# Patient Record
(demographics unavailable — no encounter records)

---

## 2025-01-03 NOTE — HISTORY OF PRESENT ILLNESS
[FreeTextEntry1] : The 42-year-old patient reports a recurring cyst in the right axilla, initially identified in 2019. It was previously drained by Dr. Vitale in 08/19 but has since returned, now causing pain and requiring further evaluation.  Patient reports that she had been prescribed a hyper cleanse wash which she has been using which has kept the area at bay.  She does not have any other cysts or draining wounds in her axilla under her breasts or in her groin.  No fevers.  The area is raw and there has been some drainage.  No odor

## 2025-01-26 NOTE — ADDENDUM
[FreeTextEntry1] : Blood will be drawn in office today.  This note was written by Mally Zimmer on 01/22/2025 acting as medical scribe for Dr. Eric Powell. I, Dr. Eric Powell, have read and attest that all the information, medical decision making and discharge instructions within are true and accurate.

## 2025-01-26 NOTE — HISTORY OF PRESENT ILLNESS
[FreeTextEntry1] : Ms. MELISSA RICE is a 42 year-old female who presents for initial endocrine evaluation. Patient presents with regard to a history of Pre-. The Pre Dm was diagnosed 10 years ago. She was never on medications for glucose control in the past. She reports dieting in the past and lost some weight but then regained it back. She is trying to get back into weight loss. She has followed various diets and exercised at the the gym. Weight was in the 300s range and went to 216lbs. she is currently trying to reduce her carbohydrates  She too has noted increased scalp hair thinning.   She would go on a diet for 7-9 days and remain off of it for 4-5 weeks. This diet would consist of 2 eggs, an orange, and grapefruit for breakfast. Protein like chicken or fish, along with fruit for lunch. For 2 days, she would have bread and cottage cheese. While following this diet, she states she would lose 5 lbs. While off her diet and back on her regular routine, she would eat some carbs. Likes eating chocolate and candy No juice or soda   She was only on Ozempic for about a month and insurance did not approve it this was back in June 2023 - then restarted it and has been paying out of pocket at this time.  248lbs last year, current weight 230lbs. She has continued on Ozempic 2mg weekly and does report stagnant levels in weight.  --She has never taken Wegovy before.  Activity: Not physically active  The patient's last A1C returned at 5.8% in June 2023. Last LD June 2023 was at 122 with Trig at 128    Family hx:  Father, DM2 thyroid dysfunction  Mother Dm 2 Brother Dm 2 younger brother with thyroid dysfunction  younger sister DM type 2  ____________________________________________________________________________________________________ Additional medical history includes that of low vitamin b12 level, GERD,   Surgeries: none    Allergies: none    Additional Medications: none   Supplements: Vd3 with K2, b12 daily, zinc, mag, fiber, Nutrafol BID   Social Hx: former smoker but now vapes daily, alcohol use none, recreational drugs none

## 2025-02-12 NOTE — PHYSICAL EXAM
[Alert] : alert [Oriented to Person] : oriented to person [Oriented to Place] : oriented to place [Oriented to Time] : oriented to time [Calm] : calm [JVD] : no jugular venous distention  [de-identified] : CATALINA BAUMAN NAD [de-identified] : EOMI [de-identified] : + right axillary hidradenitis. + previous scaring.

## 2025-02-12 NOTE — HISTORY OF PRESENT ILLNESS
[de-identified] : 44 yo female with h/o cyst removal form right axilla s/p I&D prior. presents today for evaluation. She states current tenderness but no active drainage. She states she was referred here to Dr. Merida by Plastic surgery.

## 2025-03-17 NOTE — ASSESSMENT
[FreeTextEntry1] : Patient is well, has no complaints.   incision is well healed. 5 nylon sutures removed.    f/u PRN

## 2025-05-05 NOTE — HISTORY OF PRESENT ILLNESS
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

## 2025-05-05 NOTE — HEALTH RISK ASSESSMENT
[Very Good] : ~his/her~  mood as very good [Yes] : Yes [2 - 4 times a month (2 pts)] : 2-4 times a month (2 points) [Never (0 pts)] : Never (0 points) [No falls in past year] : Patient reported no falls in the past year [0] : 2) Feeling down, depressed, or hopeless: Not at all (0) [PHQ-2 Negative - No further assessment needed] : PHQ-2 Negative - No further assessment needed [[id number]] : 0 [Former] : Former [5-9] : 5-9 [< 15 Years] : < 15 Years [NO] : No [Change in mental status noted] : No change in mental status noted [Language] : denies difficulty with language [Behavior] : denies difficulty with behavior [Learning/Retaining New Information] : denies difficulty learning/retaining new information [Handling Complex Tasks] : denies difficulty handling complex tasks [Reasoning] : denies difficulty with reasoning [Spatial Ability and Orientation] : denies difficulty with spatial ability and orientation [None] : None [With Family] : lives with family [High School] : high school [Feels Safe at Home] : Feels safe at home [Fully functional (bathing, dressing, toileting, transferring, walking, feeding)] : Fully functional (bathing, dressing, toileting, transferring, walking, feeding) [Fully functional (using the telephone, shopping, preparing meals, housekeeping, doing laundry, using] : Fully functional and needs no help or supervision to perform IADLs (using the telephone, shopping, preparing meals, housekeeping, doing laundry, using transportation, managing medications and managing finances) [Reports changes in hearing] : Reports no changes in hearing [Reports changes in vision] : Reports no changes in vision [Reports changes in dental health] : Reports no changes in dental health [Smoke Detector] : smoke detector [Carbon Monoxide Detector] : carbon monoxide detector [Seat Belt] :  uses seat belt

## 2025-05-05 NOTE — PLAN
[FreeTextEntry1] : Annual Did not get flu shot this year  need immunity testing for work  to be evaluated for sleep apnea per pulmonary consider neuro for evaluation of add Routine other blood

## 2025-06-11 NOTE — HISTORY OF PRESENT ILLNESS
[FreeTextEntry1] : Ms. MELISSA RICE is a 43-year-old female who returns for follow-up endocrine re-evaluation. Patient returns with regard to a history of Pre-DM. The Pre Dm was diagnosed 10 years ago. She was never on medications for glucose control in the past.   She was only on Ozempic for about a month before insurance stopped covering it in June 2023 - she then restarted it and had been paying out of pocket afterwards.   She is now taking Mounjaro 12.5 mg/week. Tolerating it well - Has never taken Wegovy before. - Prior was on Ozempic.  The patient states that she never got Zepbound and has been taking Mounjaro. She has been getting Mounjaro from her dietician's NP and is paying out of pocket.  Was 248 lbs in 2024, current weight 226 lbs.    Activity: Trying to walk 10k steps daily. Drinks water frequently.  The patient's latest A1C returned at 5.3% on 05/05/25. POCT glucose today at 104 mg/dL.  She too has noted increased scalp hair thinning. ____________________________________________________________________________________________________ Additional medical history includes that of low vitamin b12 level, GERD  Additional Medications: none   Supplements: Vd3 with K2, b12 daily, zinc, mag, fiber, Nutrafol BID

## 2025-06-11 NOTE — PHYSICAL EXAM
[Alert] : alert [Well Nourished] : well nourished [No Acute Distress] : no acute distress [Normal Sclera/Conjunctiva] : normal sclera/conjunctiva [Well Developed] : well developed [EOMI] : extra ocular movement intact [No Proptosis] : no proptosis [Normal Oropharynx] : the oropharynx was normal [Thyroid Not Enlarged] : the thyroid was not enlarged [No Thyroid Nodules] : no palpable thyroid nodules [No Respiratory Distress] : no respiratory distress [Clear to Auscultation] : lungs were clear to auscultation bilaterally [No Accessory Muscle Use] : no accessory muscle use [Normal S1, S2] : normal S1 and S2 [Normal Rate] : heart rate was normal [No Edema] : no peripheral edema [Regular Rhythm] : with a regular rhythm [Normal Bowel Sounds] : normal bowel sounds [Pedal Pulses Normal] : the pedal pulses are present [Not Distended] : not distended [Not Tender] : non-tender [Soft] : abdomen soft [Normal Anterior Cervical Nodes] : no anterior cervical lymphadenopathy [No Spinal Tenderness] : no spinal tenderness [Spine Straight] : spine straight [No Stigmata of Cushings Syndrome] : no stigmata of Cushings Syndrome [Normal Strength/Tone] : muscle strength and tone were normal [Normal Gait] : normal gait [No Rash] : no rash [No Tremors] : no tremors [Normal Reflexes] : deep tendon reflexes were 2+ and symmetric [Oriented x3] : oriented to person, place, and time [Acanthosis Nigricans] : no acanthosis nigricans

## 2025-06-11 NOTE — ADDENDUM
[FreeTextEntry1] : POCT glucose testing was carried out today given diabetes diagnosis.  This note was written by Mally Zimmer on 06/11/2025 acting as medical scribe for Dr. Eric Powell. I, Dr. Eric Powell, have read and attest that all the information, medical decision making and discharge instructions within are true and accurate.

## 2025-06-11 NOTE — PHYSICAL EXAM
[Alert] : alert [Well Nourished] : well nourished [No Acute Distress] : no acute distress [Well Developed] : well developed [Normal Sclera/Conjunctiva] : normal sclera/conjunctiva [No Proptosis] : no proptosis [EOMI] : extra ocular movement intact [Normal Oropharynx] : the oropharynx was normal [Thyroid Not Enlarged] : the thyroid was not enlarged [No Thyroid Nodules] : no palpable thyroid nodules [No Respiratory Distress] : no respiratory distress [No Accessory Muscle Use] : no accessory muscle use [Clear to Auscultation] : lungs were clear to auscultation bilaterally [Normal Rate] : heart rate was normal [Normal S1, S2] : normal S1 and S2 [No Edema] : no peripheral edema [Regular Rhythm] : with a regular rhythm [Pedal Pulses Normal] : the pedal pulses are present [Normal Bowel Sounds] : normal bowel sounds [Not Tender] : non-tender [Not Distended] : not distended [Soft] : abdomen soft [Normal Anterior Cervical Nodes] : no anterior cervical lymphadenopathy [No Spinal Tenderness] : no spinal tenderness [Spine Straight] : spine straight [No Stigmata of Cushings Syndrome] : no stigmata of Cushings Syndrome [Normal Gait] : normal gait [Normal Strength/Tone] : muscle strength and tone were normal [No Rash] : no rash [Normal Reflexes] : deep tendon reflexes were 2+ and symmetric [No Tremors] : no tremors [Oriented x3] : oriented to person, place, and time [Acanthosis Nigricans] : no acanthosis nigricans

## 2025-07-09 NOTE — PHYSICAL EXAM
[Normal Appearance] : normal appearance [Normal Conjunctiva] : the conjunctiva exhibited no abnormalities [III] : III [Neck Appearance] : the appearance of the neck was normal [Heart Sounds] : normal S1 and S2 [Auscultation Breath Sounds / Voice Sounds] : lungs were clear to auscultation bilaterally [Skin Color & Pigmentation] : normal skin color and pigmentation [Oriented To Time, Place, And Person] : oriented to person, place, and time

## 2025-07-10 NOTE — HISTORY OF PRESENT ILLNESS
[FreeTextEntry1] : 42 yo female with PMH pre-DM, nasal congestion (deviated septum), obesity, and RLS who presents for initial sleep evaluation.   Of note, she has also been diagnosed with RLS by a neurologist Dr. Michael Nissenbaum. Was given gabapentin 300 mg prn. Uses gabapentin 300 mg once every 2 weeks because she tries to limit medication use. Describes sensation of bugs crawling up her legs. Usually happens every night when she is in bed. Sensation is relieved by getting out of bed and changing her clothes. Most recent ferritin level was 28 in 1/2025. She is not currently on iron supplementation.   For obesity, she has been on Mounjaro 15 mg (was previously on Ozempic). She has been on Mounjaro 15 mg for the past week, prior to to that had been on 12.5 mg. She has lost 20 pounds in the past 6 months which she attributes more to diet/exercise than Mounjaro. Currently not covered by insurance and is paying out of pocket.   She reports increase in snoring over the past several months to the point that no one else can sleep in a room with her. She also wakes up with headaches and dry mouth. She has never fallen asleep while driving but sometimes feels drowsy and pulls over.   Sleep routine: goes to bed at 10/10:30, wakes up at 6-6:30AM Latency: 30 minutes Night time awakenings: 2-3 times gasping for air or dry mouth Witnessed apneas: no Snoring: yes Hallucinations/Night time activities: sleep talking Paralysis/Cataplexy: no Sleep quality: non restorative  ESS: Chance of dozing.............Situation 0........................................Sitting and reading 2........................................Watching TV 3........................................Sitting inactive in a public place (eg a theatre or a meeting) 2........................................As a passenger in a car for an hour without a break 3........................................Lying down to rest in the afternoon when circumstances permit 0........................................Sitting and talking to someone 3........................................Sitting quietly after lunch without alcohol 1........................................In a car, while stopped for a few minutes in traffic 14........................................TOTAL SCORE   0 = Never would doze 1 = Slight chance of dozing 2 = Moderate chance of dozing 3 = High chance of dozing

## 2025-07-10 NOTE — CONSULT LETTER
[Dear  ___] : Dear  [unfilled], [Consult Letter:] : I had the pleasure of evaluating your patient, [unfilled]. [Please see my note below.] : Please see my note below. [Consult Closing:] : Thank you very much for allowing me to participate in the care of this patient.  If you have any questions, please do not hesitate to contact me. [Sincerely,] : Sincerely, [FreeTextEntry2] : Dr. Quirino Thompson [FreeTextEntry3] : Angela Fuller MD, FAASM  of Medicine Associate , Fellowship in Pulmonary and Critical Care Medicine Division of Pulmonary, Critical Care & Sleep Medicine [facility] [employer] [facility]

## 2025-07-10 NOTE — END OF VISIT
[] : Fellow [FreeTextEntry3] : Total time spent 45 minutes, which includes review of the medical record, consultant notes, lab results, discussion with the patient, medical management and coordination of care.  [Time Spent: ___ minutes] : I have spent [unfilled] minutes of time on the encounter which excludes teaching and separately reported services.

## 2025-07-10 NOTE — ASSESSMENT
[FreeTextEntry1] : 42 yo female with PMH pre-DM, nasal congestion (deviated septum), obesity, and RLS who presents for initial sleep evaluation.   # Snoring # Excessive daytme sleepiness - HST ordered to evaluate for YULIANA - discussed possible treatment options including PAP therapy, mandibular advancement device, Zepbound (currently on Mounjaro but not covered by insurance)  # RLS Diagnosed with RLS by a neurologist Dr. Michael Nissenbaum. Was given gabapentin 300 mg prn. Uses gabapentin 300 mg once every 2 weeks because she tries to limit medication use. Describes sensation of bugs crawling up her legs. Usually happens every night when she is in bed. Sensation is relieved by getting out of bed and changing her clothes. Most recent ferritin level was 28 in 1/2025. She is not currently on iron supplementation. - check iron panel today - if ferritin below goal will rx iron supplementation     RTC after HST

## 2025-07-10 NOTE — REVIEW OF SYSTEMS
[EDS: ESS=____] : daytime somnolence: ESS=[unfilled] [Fatigue] : fatigue [Nasal Congestion] : nasal congestion [Snoring] : snoring [Obesity] : obesity [History of Iron Deficiency] : history of iron deficiency [Witnessed Apneas] : no witnessed apnea [Heartburn] : no heartburn [Nocturia] : no nocturia

## 2025-07-10 NOTE — CONSULT LETTER
[Dear  ___] : Dear  [unfilled], [Consult Letter:] : I had the pleasure of evaluating your patient, [unfilled]. [Please see my note below.] : Please see my note below. [Consult Closing:] : Thank you very much for allowing me to participate in the care of this patient.  If you have any questions, please do not hesitate to contact me. [Sincerely,] : Sincerely, [FreeTextEntry2] : Dr. Quirino Thompson [FreeTextEntry3] : Angela Fuller MD, FAASM  of Medicine Associate , Fellowship in Pulmonary and Critical Care Medicine Division of Pulmonary, Critical Care & Sleep Medicine [facility] [employer] at [facility]

## 2025-07-10 NOTE — HISTORY OF PRESENT ILLNESS
[FreeTextEntry1] : 44 yo female with PMH pre-DM, nasal congestion (deviated septum), obesity, and RLS who presents for initial sleep evaluation.   Of note, she has also been diagnosed with RLS by a neurologist Dr. Michael Nissenbaum. Was given gabapentin 300 mg prn. Uses gabapentin 300 mg once every 2 weeks because she tries to limit medication use. Describes sensation of bugs crawling up her legs. Usually happens every night when she is in bed. Sensation is relieved by getting out of bed and changing her clothes. Most recent ferritin level was 28 in 1/2025. She is not currently on iron supplementation.   For obesity, she has been on Mounjaro 15 mg (was previously on Ozempic). She has been on Mounjaro 15 mg for the past week, prior to to that had been on 12.5 mg. She has lost 20 pounds in the past 6 months which she attributes more to diet/exercise than Mounjaro. Currently not covered by insurance and is paying out of pocket.   She reports increase in snoring over the past several months to the point that no one else can sleep in a room with her. She also wakes up with headaches and dry mouth. She has never fallen asleep while driving but sometimes feels drowsy and pulls over.   Sleep routine: goes to bed at 10/10:30, wakes up at 6-6:30AM Latency: 30 minutes Night time awakenings: 2-3 times gasping for air or dry mouth Witnessed apneas: no Snoring: yes Hallucinations/Night time activities: sleep talking Paralysis/Cataplexy: no Sleep quality: non restorative  ESS: Chance of dozing.............Situation 0........................................Sitting and reading 2........................................Watching TV 3........................................Sitting inactive in a public place (eg a theatre or a meeting) 2........................................As a passenger in a car for an hour without a break 3........................................Lying down to rest in the afternoon when circumstances permit 0........................................Sitting and talking to someone 3........................................Sitting quietly after lunch without alcohol 1........................................In a car, while stopped for a few minutes in traffic 14........................................TOTAL SCORE   0 = Never would doze 1 = Slight chance of dozing 2 = Moderate chance of dozing 3 = High chance of dozing